# Patient Record
Sex: MALE | Race: WHITE
[De-identification: names, ages, dates, MRNs, and addresses within clinical notes are randomized per-mention and may not be internally consistent; named-entity substitution may affect disease eponyms.]

---

## 2020-11-17 ENCOUNTER — HOSPITAL ENCOUNTER (OUTPATIENT)
Dept: HOSPITAL 88 - OR | Age: 65
Setting detail: OBSERVATION
LOS: 1 days | Discharge: HOME | End: 2020-11-18
Attending: SPECIALIST | Admitting: SPECIALIST
Payer: COMMERCIAL

## 2020-11-17 VITALS — DIASTOLIC BLOOD PRESSURE: 62 MMHG | SYSTOLIC BLOOD PRESSURE: 109 MMHG

## 2020-11-17 VITALS — DIASTOLIC BLOOD PRESSURE: 75 MMHG | SYSTOLIC BLOOD PRESSURE: 118 MMHG

## 2020-11-17 VITALS — WEIGHT: 191 LBS | HEIGHT: 71 IN | BODY MASS INDEX: 26.74 KG/M2

## 2020-11-17 VITALS — SYSTOLIC BLOOD PRESSURE: 130 MMHG | DIASTOLIC BLOOD PRESSURE: 86 MMHG

## 2020-11-17 VITALS — DIASTOLIC BLOOD PRESSURE: 81 MMHG | SYSTOLIC BLOOD PRESSURE: 147 MMHG

## 2020-11-17 DIAGNOSIS — M16.12: ICD-10-CM

## 2020-11-17 DIAGNOSIS — E11.9: ICD-10-CM

## 2020-11-17 DIAGNOSIS — M19.041: ICD-10-CM

## 2020-11-17 DIAGNOSIS — M16.0: Primary | ICD-10-CM

## 2020-11-17 DIAGNOSIS — I11.9: ICD-10-CM

## 2020-11-17 DIAGNOSIS — M19.042: ICD-10-CM

## 2020-11-17 DIAGNOSIS — M17.0: ICD-10-CM

## 2020-11-17 DIAGNOSIS — Z20.828: ICD-10-CM

## 2020-11-17 DIAGNOSIS — Z01.818: ICD-10-CM

## 2020-11-17 PROCEDURE — 85025 COMPLETE CBC W/AUTO DIFF WBC: CPT

## 2020-11-17 PROCEDURE — 86850 RBC ANTIBODY SCREEN: CPT

## 2020-11-17 PROCEDURE — 82948 REAGENT STRIP/BLOOD GLUCOSE: CPT

## 2020-11-17 PROCEDURE — 36415 COLL VENOUS BLD VENIPUNCTURE: CPT

## 2020-11-17 PROCEDURE — 27130 TOTAL HIP ARTHROPLASTY: CPT

## 2020-11-17 PROCEDURE — 97530 THERAPEUTIC ACTIVITIES: CPT

## 2020-11-17 PROCEDURE — 80053 COMPREHEN METABOLIC PANEL: CPT

## 2020-11-17 PROCEDURE — 97116 GAIT TRAINING THERAPY: CPT

## 2020-11-17 PROCEDURE — 97161 PT EVAL LOW COMPLEX 20 MIN: CPT

## 2020-11-17 PROCEDURE — 97110 THERAPEUTIC EXERCISES: CPT

## 2020-11-17 PROCEDURE — 72170 X-RAY EXAM OF PELVIS: CPT

## 2020-11-17 PROCEDURE — 86900 BLOOD TYPING SEROLOGIC ABO: CPT

## 2020-11-17 RX ADMIN — Medication SCH MG: at 17:55

## 2020-11-17 RX ADMIN — SODIUM CHLORIDE SCH MLS/HR: 9 INJECTION, SOLUTION INTRAVENOUS at 14:24

## 2020-11-17 RX ADMIN — SODIUM CHLORIDE PRN MG: 900 INJECTION INTRAVENOUS at 15:54

## 2020-11-17 RX ADMIN — CEFAZOLIN SODIUM SCH MLS/HR: 1 SOLUTION INTRAVENOUS at 17:56

## 2020-11-17 RX ADMIN — SODIUM CHLORIDE SCH MLS/HR: 9 INJECTION, SOLUTION INTRAVENOUS at 22:28

## 2020-11-17 NOTE — NUR
WALKING ROUNDS PERFORMED, RECEIVED PT LAYING SEMI FOWLERS IN BED, AAOX3, RR EVEN AND 
NON-LABORED, ON ROOM AIR. AQUACEL DRESSING TO (L) HIP NOTED TO BE CDI. ABDUCTOR PILLOW IN 
PLACE AND SECURED BETWEEN LEGS. LEFT PT LAYING SEMI FOWLERS IN BED, BED IN LOW LOCKED 
POSITION, SIDE RAILS UPX2, CALL LIGHT AND PHONE WITHIN REACH.

## 2020-11-17 NOTE — DIAGNOSTIC IMAGING REPORT
EXAM:  PELVIS AP 1-2 VIEWS



DATE: 11/17/2020 11:49 AM  



INDICATION: Postop  



COMPARISON: None



FINDINGS:

There are post surgical changes from recent left hip arthroplasty. Hardware

appears intact and in anatomic alignment. There is surrounding soft tissue

edema and subcutaneous air present, likely postsurgical. Overlying skin staples

noted. 



There are moderate degenerative changes of the right hip. There is no evidence

for acute fracture or dislocation. No focal lytic or blastic abnormality is

identified.





IMPRESSION:



Expected postsurgical changes from recent left hip arthroplasty.



Signed by: Dr. Shravan Smith MD on 11/17/2020 12:23 PM

## 2020-11-17 NOTE — NUR
DR CLAYTON OFFICE PREARRANGED FOLLOWING DISCHARGE PLAN OF:HOME [address] [facility], [address]

HOME HEALTH WITH [facility] CONFIRMED WITH [phone]

DME 3 IN ONE COMMODE AND ROLLING WALKER WITH WHEELS. PROVIDED BY [employer] TO BE 
DELIVERED TO ROOM PRIOR TO DISCHARGE.

## 2020-11-17 NOTE — XMS REPORT
Continuity of Care Document

                             Created on: 2020



GALILEO DOVE

External Reference #: [id number]

: 1955

Sex: Male



Demographics





                          Address                   [address] [place]  [address]

 

                          Home Phone                [phone]

 

                          Preferred Language        English

 

                          Marital Status            Unknown

 

                          Christianity Affiliation     Unknown

 

                          Race                      Unknown

 

                                        Additional Race(s)  

 

                          Ethnic Group              Unknown





Author





                          Author                    [facility]

t

 

                          Organization              [facility]

 

                          Address                   [address] [place] [address]  [address]



 

                          Phone                     Unavailable







Support





                Name            Relationship    Address         Phone

 

                    TUCKER DOVE  PRS                 [address] [place], [place]  [id number] [phone]

 

                    TUCKER DOVE  PRS                 [address] [place], [place]  [address] [phone]







Care Team Providers





                    Care Team Member Name Role                Phone

 

                    LAKESHA HUBBARD     Attphys             Unavailable

 

                    DAVID MILLER    Admphys             Unavailable







Payers





           Payer Name Policy Type Policy Number Effective Date Expiration Date S

ource







Problems

This patient has no known problems.



Allergies, Adverse Reactions, Alerts





        Allergy Name Allergy Type Status  Severity Reaction(s) Onset Date Inacti

ve Date 

Treating Clinician        Comments                  Source

 

       No Known Allergies DA     Active U             2015 00:00:00       

               [facility]







Medications

This patient has no known medications.



Procedures

This patient has no known procedures.



Results





           Test Description Test Time  Test Comments Results    Result Comments 

Source

 

                PELVIS AP 1-2 VIEWS 2020 12:22:00                 

************************************************************[facility]         : 1955        
Sex: M************************************************************              
                                                                       [facility]                        [address], [place] [address]      Patient Name: GALILEO DOVE         
                      MR #: [id number]                  : 1955         
                         Age/Sex: 65/M  Acct #: [id number]                    
         Req #: [id number]  Adm Physician:                                      
               Ordered by: LAKESHA HUBBARD MD                         Report #: 
[id number]        Location: OR                                      Room/Bed:    
              
________________________________________________________________________________

___________________    Procedure: [id number] DX/PELVIS AP 1-2 VIEWS  Exam Date: 
20                            Exam Time: 1149                             
                REPORT STATUS: Signed    EXAM:  PELVIS AP 1-2 VIEWS      DATE: 
2020 11:49 AM        INDICATION: Postop        COMPARISON: None      
FINDINGS:   There are post surgical changes from recent left hip arthroplasty. 
Hardware   appears intact and in anatomic alignment. There is surrounding soft 
tissue   edema and subcutaneous air present, likely postsurgical. Overlying skin
staples   noted.       There are moderate degenerative changes of the right hip.
There is no evidence   for acute fracture or dislocation. No focal lytic or 
blastic abnormality is   identified.         IMPRESSION:      Expected 
postsurgical changes from recent left hip arthroplasty.      Signed by: Dr. Shravan Smith MD on 2020 12:23 PM        Dictated By: SHRAVAN SMITH MD  
Electronically Signed By: SHRAVAN SMITH MD on 20 1223  Transcribed By: 
ALLAN on 20 1223       COPY TO:   LAKESHA HUBBARD MD

## 2020-11-17 NOTE — NUR
COMPLETED BEDSIDE SHIFT REPORT AND ROUNDING WITH ONCOMING NIGHT NURSE. PATIENT IN STABLE 
CONDITION, NO S/S OF DISTRESS NOTED. NO PAIN VOICE. RESPIRATIONS, EVEN AND NONLABORED.  
DRESSING TO THE LEFT HIP C/D/I.  IV SITE ASYMPTOMATIC AND PATENT, TRANSPARENT DRESSING 
C/D/I. ABDUCTOR PILLOW IN PLACE. FOOT PUMP APPLIED. NICOLA HOSE APPLIED. BED IN LOWEST POSITION 
AND LOCKED, SIDE RAILS X 2, NONSKID SOCKS APPLIED CALL LIGHT WITHIN REACH.

## 2020-11-17 NOTE — OPERATIVE REPORT
DATE OF PROCEDURE:  11/17/2020

 

SURGEON:  Benjamin Salamanca MD

 

ASSISTANT:  Marc Tomas, certified PA.

 

PREOPERATIVE DIAGNOSIS:  Osteoarthritis, left hip.

 

POSTOPERATIVE DIAGNOSIS:  Osteoarthritis, left hip.

 

PROCEDURE:  Left total hip arthroplasty.

 

INDICATIONS:  The patient is a 65-year-old gentleman with advanced osteoarthritis of

both knees and both hips.  He presented to my office with the desire to proceed with

definitive treatment.  He would like to start with a left total hip replacement.  The

risks and benefits of the procedure were thoroughly explained.  All of his questions

were answered.  He states he understands and wishes to proceed. 

 

DESCRIPTION OF PROCEDURE:  The patient was brought to the operating room and placed

under general anesthetic.  He received prophylactic antibiotics and tranexamic acid in

the holding area.  He was positioned in the right lateral decubitus position.  His left

hip was prepped and draped in a sterile manner.  A preoperative time-out was performed.

A posterior approach was made to the left hip.  Hemostasis was obtained with

electrocautery.  The deep fascia was incised and a self-retaining Charnley retractor was

placed.  The hip was severely contracted.  Exposure of the posterior capsule was

challenging.  A portion of the short external rotators and posterior capsule were

released.  Additional soft tissue releases were performed to eventually allow

dislocation of the hip joint.  An oscillating saw was used to resect the femoral head.

Complete loss of articular cartilage was noted.  Acetabular retractors were placed.  A

fairly aggressive soft tissue releases were necessary to gain exposure to the socket.

Even with these releases, exposure was challenging.  The true floor of the acetabulum

was established with a 46 mm reamer.  The socket was sequentially reamed up to 59 mm.

This accomplished bleeding hemispherical cancellous bone.  A [employer] OsseoTi

socket with a 60 mm outer diameter was then impacted into place.  Good primary bone

fixation was felt to be obtained.  Fixation was augmented with a single 25 mm cancellous

screw placed into the ilium.  A highly cross-linked polyethylene liner with a 36 mm

inner diameter was then seated into place.  Care was taken to make sure that there was

no evidence of soft tissue interposition.  The hip was thoroughly irrigated on several

occasions with a shower tip pulsatile lavage.  The socket was then packed with a moistly

soaked lap sponge.  Attention was directed towards the proximal femur.  A box cutting

osteotome and taper pin reamer were used to establish entry to the femoral canal.  The

Rajinder/Bi[employer] taper lock broaches were impacted.  It required in size 18 stem to have

good canal fill and stability for trial reduction.  I elected to use a +3 mm head to

diminish anterior impingement.  This also seemed to provide appropriate restoration of

limb length and good stability throughout the arc of motion.  With bilateral significant

knee contractures, assessment of limb length was challenging.  The trial components were

then removed.  The hip was further irrigated with a shower tip pulsatile lavage.  The

implant was seated in approximately 15 degrees of anteversion.  The +3 mm neck and 36 mm

ceramic head were then seated onto the stem once it had been cleaned and dried.  A final

reduction was performed.  A large thickened portion of the posterior capsule was

preserved and was repaired using #2 Ethibond.  The hip was further irrigated and then

500 mg of vancomycin powder were sprinkled into the deep wound.  The fascia was closed

with interrupted #2 Ethibond.  The skin was closed with subcuticular Vicryl and staples.

 A sterile bandage was applied.  The patient was returned to the supine position, 

extubated and transported to recovery room in stable condition.  Estimated blood loss

was 100 mL.  All needle and sponge counts were correct. 

 

 

 

 

______________________________

Benjamin Salamanca MD DR/MARCUS

D:  11/17/2020 11:26:56

T:  11/17/2020 13:35:19

Job #:  [id number]

## 2020-11-17 NOTE — NUR
RECEIVED REPORT FROM [facility]. PATIENT ON THE UNIT @ 1320 VIA STRETCHER. PATIENT IN 
STABLE CONDITION, NO S/S OF DISTRESS NOTED. NO PAIN VOICE. RESPIRATIONS, EVEN AND 
NONLABORED.  DRESSING TO THE RIGHT HIP C/D/I.  IV SITE ASYMPTOMATIC AND PATENT, TRANSPARENT 
DRESSING C/D/I. ABDUCTOR PILLOW IN PLACE. NICOLA HOSE APPLIED. BED IN LOWEST POSITION AND 
LOCKED, SIDE RAILS X 2, NONSKID SOCKS APPLIED CALL LIGHT WITHIN REACH.

## 2020-11-17 NOTE — CONSULTATION
DATE OF CONSULTATION:  11/17/2020  

 

REASON FOR CONSULTATION:  Medical management.

 

HISTORY OF PRESENT ILLNESS:  This is a 65-year-old white man, who was initially 
admitted

to [facility] with diagnosis of advanced left 
hip

osteoarthritis.  Today, the patient underwent left total hip arthroplasty, which
was

performed by Dr. Benjamin Salamanca.  The patient states he is doing well.  The 
patient

states this pain is well controlled.  The patient voiced no complaints this 
time. 

 

REVIEW OF SYSTEMS:

GENERAL:  Weight is stable.  No fever or chills. 

HEENT:  No headaches, no vision changes. 

CARDIOVASCULAR/RESPIRATORY:  No chest pain, no short of breath or cough. 

GI:  No nausea, vomiting, or constipation. 

:  No dysuria or hematuria incontinence.  No Viramontes catheter in place. 

NEUROMUSCULAR:  No limb weakness or numbness.  The patient states he does have 
arthritic

pain in his bilateral knees.  The patient states he does have arthritic joint 
pain in

his bilateral knees.  The patient states his left hip joint pain is currently 
well

controlled. 

 

ALLERGIES:  CODEINE.

 

HOME MEDICATIONS:  

1. Acetaminophen 500 mg every 6 hours p.r.n. pain.

2. Amlodipine/benazepril 10 mg/20 mg once daily.

3. Gabapentin 100 mg at bedtime.

4. Metformin 500 mg b.i.d.

5. Pantoprazole 40 mg daily.

6. Tramadol 50 mg at bedtime prn pain..

 

SURGICAL HISTORY:  

1. Bilateral knee arthroscopy.

2. Left total hip arthroplasty today.

 

SOCIAL HISTORY:  He is , lives with his wife.  He does not smoke tobacco.
 He

does drink alcohol in the form of a beer, usually two beers a week.  The patient
is

employed as a petrochemical . 

 

FAMILY HISTORY:  Noncontributory.

 

PAST MEDICAL HISTORY:  

1. Hypertensive heart disease.

2. Type 2 diabetes.

3. GERD.

4. Generalized osteoarthritis.

 

PHYSICAL EXAMINATION:

GENERAL:  He is awake, alert, fully oriented and very pleasant exam.  The 
patient is quite

talkative.  He is in no obvious distress.  His wife is at bedside, height 5 feet
11

inches, weight 190 pounds, BMI 26. 

VITAL SIGNS:  Blood pressure is 130/86, pulse is 100, respiratory rate is 22,

temperature 97.6, oxygen 100% on room air. 

INTEGUMENT:  Skin is warm and dry.  No pallor, jaundice, diaphoresis. 

HEENT:  Anicteric sclerae.  Moist mucous membranes.  The patient has bilateral 
Nathaniel's

sign. 

NECK:  Supple. 

CARDIOVASCULAR:  Regular rate and rhythm. 

LUNGS:  No rales, no rhonchi, no wheezes. 

ABDOMEN:  Benign.  Normal bowel sounds, nontender. 

EXTREMITIES:  The patient's left hip incisional wound is currently dressed and 
clean,

dry, intact. 

EXTREMITIES:  No edema or deformity. 

NEUROLOGIC:  Intact.



DIAGNOSES:  

1. Status post left total hip arthroplasty.

2. Hypertensive heart disease.

3. Type 2 diabetes mellitus.

4. Generalized osteoarthritis (most predominantly in knees and hands).

 

PLAN:  

1. Blood glucose control.

2. Blood pressure control.

3. Mobilize patient.

4. Encourage incentive spirometer use to prevent atelectasis.

5. Pain control.

6. I would like to thank, Dr. Salamanca, for this generous consult. 



I spent 40 minutes in the care of the patient.

 

 

 

 

______________________________

MD LUCINA Mae/MARCUS

D:  11/17/2020 17:57:45

T:  11/17/2020 18:27:45

Job #:  [id number]

 

MTDMICHAEL

## 2020-11-18 VITALS — DIASTOLIC BLOOD PRESSURE: 79 MMHG | SYSTOLIC BLOOD PRESSURE: 141 MMHG

## 2020-11-18 VITALS — DIASTOLIC BLOOD PRESSURE: 88 MMHG | SYSTOLIC BLOOD PRESSURE: 146 MMHG

## 2020-11-18 VITALS — DIASTOLIC BLOOD PRESSURE: 90 MMHG | SYSTOLIC BLOOD PRESSURE: 135 MMHG

## 2020-11-18 VITALS — SYSTOLIC BLOOD PRESSURE: 146 MMHG | DIASTOLIC BLOOD PRESSURE: 88 MMHG

## 2020-11-18 VITALS — SYSTOLIC BLOOD PRESSURE: 140 MMHG | DIASTOLIC BLOOD PRESSURE: 74 MMHG

## 2020-11-18 LAB
ALBUMIN SERPL-MCNC: 3.3 G/DL (ref 3.5–5)
ALBUMIN/GLOB SERPL: 1.3 {RATIO} (ref 0.8–2)
ALP SERPL-CCNC: 92 IU/L (ref 40–150)
ALT SERPL-CCNC: 22 IU/L (ref 0–55)
ANION GAP SERPL CALC-SCNC: 10.3 MMOL/L (ref 8–16)
BASOPHILS # BLD AUTO: 0 10*3/UL (ref 0–0.1)
BASOPHILS NFR BLD AUTO: 0.1 % (ref 0–1)
BUN SERPL-MCNC: 19 MG/DL (ref 7–26)
BUN/CREAT SERPL: 24 (ref 6–25)
CALCIUM SERPL-MCNC: 8.5 MG/DL (ref 8.4–10.2)
CHLORIDE SERPL-SCNC: 101 MMOL/L (ref 98–107)
CO2 SERPL-SCNC: 26 MMOL/L (ref 22–29)
DEPRECATED NEUTROPHILS # BLD AUTO: 8.2 10*3/UL (ref 2.1–6.9)
EGFRCR SERPLBLD CKD-EPI 2021: > 60 ML/MIN (ref 60–?)
EOSINOPHIL # BLD AUTO: 0 10*3/UL (ref 0–0.4)
EOSINOPHIL NFR BLD AUTO: 0 % (ref 0–6)
ERYTHROCYTE [DISTWIDTH] IN CORD BLOOD: 12.5 % (ref 11.7–14.4)
GLOBULIN PLAS-MCNC: 2.6 G/DL (ref 2.3–3.5)
GLUCOSE SERPLBLD-MCNC: 200 MG/DL (ref 74–118)
HCT VFR BLD AUTO: 31.1 % (ref 38.2–49.6)
HGB BLD-MCNC: 11.1 G/DL (ref 14–18)
LYMPHOCYTES # BLD: 1 10*3/UL (ref 1–3.2)
LYMPHOCYTES NFR BLD AUTO: 9.6 % (ref 18–39.1)
MCH RBC QN AUTO: 28.4 PG (ref 28–32)
MCHC RBC AUTO-ENTMCNC: 35.7 G/DL (ref 31–35)
MCV RBC AUTO: 79.5 FL (ref 81–99)
MONOCYTES # BLD AUTO: 0.7 10*3/UL (ref 0.2–0.8)
MONOCYTES NFR BLD AUTO: 7.3 % (ref 4.4–11.3)
NEUTS SEG NFR BLD AUTO: 82.5 % (ref 38.7–80)
PLATELET # BLD AUTO: 150 X10E3/UL (ref 140–360)
POTASSIUM SERPL-SCNC: 4.3 MMOL/L (ref 3.5–5.1)
RBC # BLD AUTO: 3.91 X10E6/UL (ref 4.3–5.7)
SODIUM SERPL-SCNC: 133 MMOL/L (ref 136–145)

## 2020-11-18 RX ADMIN — CEFAZOLIN SODIUM SCH MLS/HR: 1 SOLUTION INTRAVENOUS at 01:53

## 2020-11-18 RX ADMIN — SODIUM CHLORIDE PRN MG: 900 INJECTION INTRAVENOUS at 10:03

## 2020-11-18 RX ADMIN — Medication SCH MG: at 08:53

## 2020-11-18 RX ADMIN — SODIUM CHLORIDE SCH MLS/HR: 9 INJECTION, SOLUTION INTRAVENOUS at 08:54

## 2020-11-18 RX ADMIN — CEFAZOLIN SODIUM SCH MLS/HR: 1 SOLUTION INTRAVENOUS at 08:54

## 2020-11-18 NOTE — NUR
Patient discharged from facility. Patient gathered all personal belongings, discharge 
instructions, and follow up information. Left unit in wheelchair and went home via private 
auto. No s/s of distress when leaving facility.

## 2020-11-18 NOTE — PROGRESS NOTE
DATE:  11/18/2020  

 

CHIEF COMPLAINT/HISTORY OF PRESENT ILLNESS:  This is a 65-year-old white man 
whose

primary treating diagnosis is recent left total hip arthroplasty.  The patient 
underwent

the surgical procedure yesterday on Tuesday, November 17, 2020.  The patient 
tolerated

surgery quite well.  The patient states his pain was well controlled overnight 
with oral

acetaminophen.  The patient voices no complaints today.  Today's hemoglobin is 
11.1

g/dL. 

 

REVIEW OF SYSTEMS:

As per HPI.

 

PHYSICAL EXAMINATION:

GENERAL:  He is awake, alert.  He is fully oriented. 

VITAL SIGNS:  Height 5 feet 11 inches, weight 190 pounds, BMI 26.  Blood 
pressure is

146/88, pulse 82, respiratory rate 22, temperature 98.0, oxygen saturation 100% 
on room

air. 

SKIN:  Warm and dry.  No pallor, jaundice, or diaphoresis. 

HEENT:  Anicteric sclerae.  Moist mucous membranes. 

NECK:  Supple. 

CARDIOVASCULAR:  Distant heart sounds.  Regular rate and rhythm with faint S4 
gallop. 

LUNGS:  No rales.  No rhonchi.  No wheezes. 

ABDOMEN:  Benign. 

EXTREMITIES:  The patient's left hip incision wound is currently dressed, but it
is

clean, dry, and intact.  The patient has no edema in legs. 

NEUROLOGIC:  Intact.



DIAGNOSES:  

1. Status post left total hip arthroplasty.

2. Hypertensive heart disease.

3. Type 2 diabetes mellitus.

4. Generalized osteoarthritis (knees and hands).

 

PLAN:  

1. Blood glucose control.

2. Blood pressure control.

3. Mobilize patient with therapy.

4. Encourage incentive spirometer usage to prevent atelectasis.

5. Pain control.

6. Discharge planning for today tentatively.

7. I would like to thank, Dr. Salamanca, for involving me in the care of this 
patient. 



I spent 25 minutes in the care of the patient.

 

 

 

 

______________________________

MD LUCINA Mae/MARCUS

D:  11/18/2020 08:52:16

T:  11/18/2020 09:15:30

Job #:  [id number]

 

MTDMICHAEL

## 2020-11-18 NOTE — NUR
Received patient resting in bed. No s/s of distress. Bed low, wheels locked, side rails x2. 
Call light in reach will continue to monitor patient.